# Patient Record
Sex: FEMALE
[De-identification: names, ages, dates, MRNs, and addresses within clinical notes are randomized per-mention and may not be internally consistent; named-entity substitution may affect disease eponyms.]

---

## 2018-10-31 ENCOUNTER — HOSPITAL ENCOUNTER (INPATIENT)
Dept: HOSPITAL 89 - OR | Age: 53
LOS: 1 days | Discharge: HOME | DRG: 460 | End: 2018-11-01
Attending: ORTHOPAEDIC SURGERY | Admitting: ORTHOPAEDIC SURGERY
Payer: COMMERCIAL

## 2018-10-31 VITALS — SYSTOLIC BLOOD PRESSURE: 111 MMHG | DIASTOLIC BLOOD PRESSURE: 70 MMHG

## 2018-10-31 VITALS — SYSTOLIC BLOOD PRESSURE: 115 MMHG | DIASTOLIC BLOOD PRESSURE: 82 MMHG

## 2018-10-31 VITALS — DIASTOLIC BLOOD PRESSURE: 83 MMHG | SYSTOLIC BLOOD PRESSURE: 117 MMHG

## 2018-10-31 VITALS — SYSTOLIC BLOOD PRESSURE: 109 MMHG | DIASTOLIC BLOOD PRESSURE: 91 MMHG

## 2018-10-31 VITALS — SYSTOLIC BLOOD PRESSURE: 121 MMHG | DIASTOLIC BLOOD PRESSURE: 82 MMHG

## 2018-10-31 VITALS
BODY MASS INDEX: 24.86 KG/M2 | WEIGHT: 164 LBS | BODY MASS INDEX: 24.86 KG/M2 | HEIGHT: 68 IN | HEIGHT: 68 IN | WEIGHT: 164 LBS

## 2018-10-31 VITALS — DIASTOLIC BLOOD PRESSURE: 80 MMHG | SYSTOLIC BLOOD PRESSURE: 118 MMHG

## 2018-10-31 VITALS — DIASTOLIC BLOOD PRESSURE: 77 MMHG | SYSTOLIC BLOOD PRESSURE: 121 MMHG

## 2018-10-31 VITALS — SYSTOLIC BLOOD PRESSURE: 114 MMHG | DIASTOLIC BLOOD PRESSURE: 81 MMHG

## 2018-10-31 VITALS — DIASTOLIC BLOOD PRESSURE: 80 MMHG | SYSTOLIC BLOOD PRESSURE: 119 MMHG

## 2018-10-31 VITALS — DIASTOLIC BLOOD PRESSURE: 70 MMHG | SYSTOLIC BLOOD PRESSURE: 111 MMHG

## 2018-10-31 VITALS — DIASTOLIC BLOOD PRESSURE: 57 MMHG | SYSTOLIC BLOOD PRESSURE: 105 MMHG

## 2018-10-31 VITALS — DIASTOLIC BLOOD PRESSURE: 84 MMHG | SYSTOLIC BLOOD PRESSURE: 123 MMHG

## 2018-10-31 VITALS — SYSTOLIC BLOOD PRESSURE: 121 MMHG | DIASTOLIC BLOOD PRESSURE: 74 MMHG

## 2018-10-31 DIAGNOSIS — Z90.710: ICD-10-CM

## 2018-10-31 DIAGNOSIS — M32.9: ICD-10-CM

## 2018-10-31 DIAGNOSIS — Z87.891: ICD-10-CM

## 2018-10-31 DIAGNOSIS — K21.9: ICD-10-CM

## 2018-10-31 DIAGNOSIS — M48.062: Primary | ICD-10-CM

## 2018-10-31 DIAGNOSIS — Z99.81: ICD-10-CM

## 2018-10-31 DIAGNOSIS — J44.9: ICD-10-CM

## 2018-10-31 DIAGNOSIS — M51.16: ICD-10-CM

## 2018-10-31 DIAGNOSIS — Z79.52: ICD-10-CM

## 2018-10-31 DIAGNOSIS — M43.16: ICD-10-CM

## 2018-10-31 LAB — PLATELET COUNT, AUTOMATED: 246 K/UL (ref 150–450)

## 2018-10-31 PROCEDURE — 86850 RBC ANTIBODY SCREEN: CPT

## 2018-10-31 PROCEDURE — 86901 BLOOD TYPING SEROLOGIC RH(D): CPT

## 2018-10-31 PROCEDURE — 36415 COLL VENOUS BLD VENIPUNCTURE: CPT

## 2018-10-31 PROCEDURE — 86900 BLOOD TYPING SEROLOGIC ABO: CPT

## 2018-10-31 PROCEDURE — 01NB0ZZ RELEASE LUMBAR NERVE, OPEN APPROACH: ICD-10-PCS | Performed by: ORTHOPAEDIC SURGERY

## 2018-10-31 PROCEDURE — 0SB20ZZ EXCISION OF LUMBAR VERTEBRAL DISC, OPEN APPROACH: ICD-10-PCS | Performed by: ORTHOPAEDIC SURGERY

## 2018-10-31 PROCEDURE — 85025 COMPLETE CBC W/AUTO DIFF WBC: CPT

## 2018-10-31 PROCEDURE — 0SG0071 FUSION OF LUMBAR VERTEBRAL JOINT WITH AUTOLOGOUS TISSUE SUBSTITUTE, POSTERIOR APPROACH, POSTERIOR COLUMN, OPEN APPROACH: ICD-10-PCS | Performed by: ORTHOPAEDIC SURGERY

## 2018-10-31 PROCEDURE — 72020 X-RAY EXAM OF SPINE 1 VIEW: CPT

## 2018-10-31 PROCEDURE — 72100 X-RAY EXAM L-S SPINE 2/3 VWS: CPT

## 2018-10-31 PROCEDURE — 97161 PT EVAL LOW COMPLEX 20 MIN: CPT

## 2018-10-31 RX ADMIN — SODIUM CHLORIDE SCH MG: 900 INJECTION, SOLUTION INTRAVENOUS at 21:18

## 2018-10-31 RX ADMIN — HYDROCODONE BITARTRATE AND ACETAMINOPHEN PRN EACH: 5; 325 TABLET ORAL at 21:07

## 2018-10-31 RX ADMIN — DOCUSATE SODIUM SCH MG: 100 CAPSULE, LIQUID FILLED ORAL at 21:08

## 2018-10-31 RX ADMIN — HYDROCODONE BITARTRATE AND ACETAMINOPHEN PRN EACH: 5; 325 TABLET ORAL at 16:57

## 2018-10-31 RX ADMIN — CEFAZOLIN SODIUM SCH MLS/HR: 2 SOLUTION INTRAVENOUS at 21:16

## 2018-10-31 NOTE — RADIOLOGY IMAGING REPORT
FACILITY: [facility] 

 

PATIENT NAME: Alisson Montgomery

: 1965

MR: [id number]

V: [id number]

EXAM DATE: 

ORDERING PHYSICIAN: AMRITA GUERRA

TECHNOLOGIST: 

 

Location: [facility]

Patient: Alisson Montgomery

: 1965

MRN: [id number]

Visit/Account:[id number]

Date of Sevice: 10/31/2018

 

ACCESSION #: [id number]

 

EXAMINATION:  Intraoperative imaging of the lumbar spine  10/31/2018 11:54 AM

 

HISTORY:  L4-5 PLIF

 

COMPARISON:  MR 2018 from [facility]

 

FINDINGS:  Intraoperative imaging was done.  Lateral images of the lumbar spine demonstrate initial p
osterior localization with subsequent pedicle screw placement and posterior fusion from L4 through L5
.  Aorta is atherosclerotic.

 

IMPRESSION: Lumbar imaging provided intraoperatively during L4-5 fusion.

 

Report Dictated By: Hipolito Li MD at 10/31/2018 5:58 PM

 

Report E-Signed By: Hipolito Li MD  at 10/31/2018 6:00 PM

 

WSN:[id number]

## 2018-10-31 NOTE — HOSPITALIST CONSULTATION
History of Present Illness


Requesting Physician


Dr Sharpe


Reason for Consult


Lupus


History of Present Illness


This patient was admitted for a laminectomy and fusion.  It is reported that the


surgery went well and was without complication.





History


Problems:  


(1) GERD (gastroesophageal reflux disease)


(2) History of hysterectomy


(3) Lupus


Home Meds


Reported Medications


Tramadol Hcl (TRAMADOL HCL) 50 Mg Tablet, 50 MG PO Q4-6H PRN for PAIN, TAB


   10/29/18


Hydrocodone Bit/Acetaminophen (HYDROCODON-ACETAMINOPHEN 5-325) 1 Each Tablet, 1 


EACH PO Q4H PRN for PAIN, TAB


   10/29/18


Loratadine (LORATADINE) 10 Mg Tablet, 10 MG PO QDAY


   10/29/18


Ondansetron (ZOFRAN ODT) 4 Mg Tab.rapdis, 4 MG PO PRN, TAB.SOL


   10/29/18


Potassium Bicarbonate/Cit Ac (POTASSIUM 25 MEQ TABLET EFF) 25 Meq Tablet.eff, 1 


TAB PO DAILY


   10/29/18


Lorazepam (LORAZEPAM) 0.5 Mg Tablet, 0.5 MG PO Q4-6H PRN for ANXIETY


   10/29/18


Lysine (LYSINE) 500 Mg Tablet, 500 MG PO DAILY


   10/29/18


Ascorbic Acid (VITAMIN C) 1,000 Mg Tablet.er, 1000 MG PO QDAY


   10/29/18


Calcium Carbonate (CALCIUM) 600 Mg Tablet, 800 MG PO QDAY


   10/29/18


Cholecalciferol (Vitamin D3) (VITAMIN D3) 1,000 Unit Tablet, 1000 UNIT PO QDAY, 


TAB


   10/29/18


Loratadine (LORATADINE) 10 Mg Tab.rapdis, 10 MG PO QDAYA


   10/29/18


Pantoprazole Sodium (PANTOPRAZOLE SODIUM) 40 Mg Tablet.dr, 40 MG PO QDAY, TAB.SR


   10/29/18


Trazodone Hcl (TRAZODONE HCL) 100 Mg Tablet, 200 MG PO QHS, TAB


   10/29/18


Citalopram Hydrobromide (CITALOPRAM HBR) 20 Mg Tablet, 40 MG PO QDAY, #5 TAB


   10/29/18


Dapsone (DAPSONE) 100 Mg Tablet, 100 MG PO QDAY


   10/29/18


Prednisone (PREDNISONE) 20 Mg Tablet, 20-40 MG PO QDAY, TAB


   10/29/18


Allergies:  


Coded Allergies:  


     No Known Drug Allergies (Unverified , 10/29/18)


Patient History:  


FHx: lymphoma


  CHILD


Hx Smoking:  Yes (1 PPD FOR 30 YEARS)


Caffeine Intake:  Coffee


Caffeine/Cups Per Day:  4-6 CUPS QDAY


Hx Alcohol Use:  No


Hx Substance Use Disorder:  No





Review of Systems


Cardiovascular:  No Chest Pain


Respiratory:  No Shortness of Breath





Exam


Vital Signs





Vital Signs








  Date Time  Temp Pulse Resp B/P (MAP) Pulse Ox O2 Delivery O2 Flow Rate FiO2


 


10/31/18 16:30 98.7 75 12  93 Nasal Cannula 4.0 








Neuro:  No Gross deficits


Eyes:  PERRLA


Cardiovascular:  Regular Rate and Rhythm


Respiratory:  Clear to Auscultation


Extremities:  No Edema





Medical Decision Making


Data Points


Result Diagram:  


10/29/18 1012








Assessment and Plan


Problems:  


(1) Lupus


Assessment & Plan:  She is on chronic treatment with prednisone.  We have 


implemented the recommendations from her primary physician for stress dosing.





(2) GERD (gastroesophageal reflux disease)


Assessment & Plan:  She is on chronic treatment with Protonix.





Copies to:   AMRITA SHARPE MD ;





Venous Thromboembolism


Antithrombotics


Is Pt On Any Antithrombotics?:  No











IVAN FERRELL DO                  Oct 31, 2018 17:51

## 2018-10-31 NOTE — OPERATIVE REPORT 1
EVENT DATE:  October 31, 2018 

SURGEON:  Jose E Sharpe MD 

ANESTHESIOLOGIST:  Chivo Alfaro MD 

ANESTHESIA:  General endotracheal anesthesia.

ASSISTANT:  BANDAR Gill 





PREOPERATIVE DIAGNOSIS  

L4-L5 spinal stenosis with L4-L5 spondylolisthesis.



POSTOPERATIVE DIAGNOSIS 

L4-L5 spinal stenosis with L4-L5 spondylolisthesis.



PROCEDURES PERFORMED 

1.  L4-L5 laminectomy.

2.  L4-L5 diskectomy. 

3.  L4-L5 posterolateral instrumented fusion.



INTRAVENOUS FLUIDS 

1700 mL 



ESTIMATED BLOOD LOSS 

120 mL 



IMPLANTS USED 

Reline pedicle screws 6.5 mm x 45 mm times four from [employer], 5.5 mm x 40 mm 

connecting rods from [employer] times two, locking caps times four from [employer].



SPECIMENS

None.



DRAINS

None.



COMPLICATIONS

None.  



DISPOSITION 

Post-Anesthesia Care Unit.  



INDICATIONS FOR SURGERY

Ms. Montgomery is a 53-year-old female with a long-standing history of worsening 

right lower extremity radiating pain, numbness, and tingling, primarily in an L5

distribution.  Her symptoms were severe and unrelenting despite physical 

therapy, injections, medications, and activity modifications.  Her physical 

examination was essentially normal with normal strength and sensation, but her 

MRI showed severe spinal stenosis at L4-L5 secondary to a combination of a 

large, broad-based disk bulge, facet hypertrophy, and ligamentum flavum 

thickening.  A grade 1 anterolisthesis was seen at that level as well.  

Secondary to ongoing symptoms and failure of nonsurgical care, Ms. Montgomery was 

offered and elected to undergo L4-L5 laminectomy and posterolateral instrumented

fusion.  



Prior to surgery, I explained in detail to the patient the possible risks of 

surgery.  These risks include bleeding, infection, damage to surrounding 

structures, nerve root injury, spinal fluid leak, meningitis, persistent and/or 

worsening pain, hardware failure, need for further surgery, death, blindness, 

sexual dysfunction, and autonomic nervous system dysfunction, as well as other 

unforeseen medical and surgical complications.  Ms. Montgomery understands that her

risk of complications are increased secondary to her autoimmune disease and need

for chronic steroid use.  



DESCRIPTION OF PROCEDURE 

On the date of surgery, the patient was met in the preoperative hold area, and 

all questions were answered.  The operative site was identified and marked by 

myself.  The patient was brought in good condition to the operating room, and 

after succumbing to anesthesia, was positioned in the prone position on a 

Gareth table.  All bony protuberances and soft tissues were well padded in the 

standard fashion.  Care was taken to maintain appropriate perfusion pressures 

during anesthesia.  Preoperative antibiotics were administered according to the 

appropriate timing schedule.  At the conclusion of the procedure, sponge and 

needle counts were correct times two.  



Final timeout was undertaken by members of the operating team to confirm correct

patient, correct levels, and correct surgery.  The patient was then prepped and 

draped in the standard sterile orthopedic fashion, and a midline vertical 

incision was made over the intended spinal levels.  Sharp dissection was carried

out down to the posterior elements, and soft tissues were elevated off the 

posterior elements in a subperiosteal manner.  A lateral radiograph was obtained

to confirm appropriate spinal level.  Soft tissues were cleared down the 

laminae, up the pars interarticularis, and out to the tips of the transverse 

processes bilaterally at L4 and L5.  Thrombin-soaked sponges were then placed in

the lateral gutters to maintain that space.  



A Leksell rongeur was used to remove the inferior aspect of the L3 lamina, the 

entire L4 lamina, and the superior aspect of the L5 lamina.  Spinous processes 

were removed as well.  The lamina was thinned down the midline, again with a 

Leksell rongeur, and then a Johnson curette was used to enter the canal by 

undermining the superior insertion of the ligamentum flavum from the inferior 

aspect of the L4 lamina.  The Acadia elevator was used prior to the use of the 

Kerrison punch to ensure no dural adhesions were present, and we cleared any of 

these from surrounding bone and soft tissue.  Kerrison 3.0 and 4.0 rongeurs were

then used to perform a midline decompression, and then we performed bilateral 

lateral recess decompressions again with #3 and #4 Kerrisons.  The Acadia 

elevator was used to check the lateral recesses for any persistent compression, 

and none was found.  Limited foraminotomies were performed bilaterally of all 

affected nerve roots to ensure that those were well decompressed as well.  The 

Acadia was then used to sweep anterior to the L5 nerve root shoulder as well as

the dura, and then the nerve root shoulder and dura were retracted towards the 

midline.  A large, calcified, contained disk herniation was found, and an 

annulotomy was performed.  Several large disk fragments were removed.  This 

resulted in no further compression of the neural elements as they traversed the 

disk space.  



Attention was then turned to placement of pedicle screws.  Starting points for 

pedicle screws were identified at approximately the junction of the pars 

interarticularis, the mid point of the transverse process, and the lateral 

aspect of the facet joint.  A high-speed lora was used to decorticate the 

starting point, and then a gear shift type instrument was used to advance 

through the pedicle isthmus using a push-pull method.  A ball tip feeler was 

used to palpate through the pedicle, palpating superiorly, inferiorly, medially,

laterally, and distally to ensure absence of bony breaching.  Screws 6.5 mm x 45

mm were then chosen and placed bilaterally in a similar fashion at L4 and L5.  

Neurophysiologic monitoring was used to test the screws and confirm absence of 

bony breaching, and all screws tested greater than 30 mA.  Connecting rods 40 mm

were then chosen and placed within the tulips of the screws bilaterally.  

Locking caps were placed, tightened, and then finally tightened.  



A lateral radiograph was obtained to confirm appropriate positioning of the 

instrumentation.  A high-speed lora was then used to decorticate the transverse 

processes of L4 and L5 bilaterally.  A combination of milled local bone and 

demineralized bone matrix was then packed into the lateral gutters overlying the

decorticated transverse processes bilaterally.  Of note, prior to decortication 

of the transverse processes and placement of bone graft, we did irrigate the 

wound with 2 L of sterile saline solution.  



Meticulous hemostasis was then obtained, and the wound was closed in layers 

using interrupted sutures for the deep fascia, inverted interrupted sutures for 

the subcutaneous tissue, and a running subcuticular skin stitch.  Sponge and 

needle counts were correct times two.



POSTOPERATIVE CARE PLAN 

Ms. Montgomery will remain in the hospital until she meets discharge criteria.  She

will follow up in my clinic in two weeks' time for a wound check and 

examination.  

BRAXTON

## 2018-11-01 VITALS — DIASTOLIC BLOOD PRESSURE: 81 MMHG | SYSTOLIC BLOOD PRESSURE: 137 MMHG

## 2018-11-01 VITALS — DIASTOLIC BLOOD PRESSURE: 73 MMHG | SYSTOLIC BLOOD PRESSURE: 124 MMHG

## 2018-11-01 VITALS — DIASTOLIC BLOOD PRESSURE: 80 MMHG | SYSTOLIC BLOOD PRESSURE: 133 MMHG

## 2018-11-01 RX ADMIN — HYDROCODONE BITARTRATE AND ACETAMINOPHEN PRN EACH: 5; 325 TABLET ORAL at 03:22

## 2018-11-01 RX ADMIN — DOCUSATE SODIUM SCH MG: 100 CAPSULE, LIQUID FILLED ORAL at 08:48

## 2018-11-01 RX ADMIN — SODIUM CHLORIDE SCH MG: 900 INJECTION, SOLUTION INTRAVENOUS at 03:27

## 2018-11-01 RX ADMIN — SODIUM CHLORIDE SCH MG: 900 INJECTION, SOLUTION INTRAVENOUS at 11:33

## 2018-11-01 RX ADMIN — CEFAZOLIN SODIUM SCH MLS/HR: 2 SOLUTION INTRAVENOUS at 11:33

## 2018-11-01 RX ADMIN — HYDROCODONE BITARTRATE AND ACETAMINOPHEN PRN EACH: 5; 325 TABLET ORAL at 07:31

## 2018-11-01 RX ADMIN — HYDROCODONE BITARTRATE AND ACETAMINOPHEN PRN EACH: 5; 325 TABLET ORAL at 11:33

## 2018-11-01 RX ADMIN — CEFAZOLIN SODIUM SCH MLS/HR: 2 SOLUTION INTRAVENOUS at 03:25

## 2018-11-01 NOTE — RADIOLOGY IMAGING REPORT
FACILITY: [facility] [place] 

 

PATIENT NAME: Alisson Montgomery

: 1965

MR: [id number]

V: [id number]

EXAM DATE: 

ORDERING PHYSICIAN: AMRITA GUERRA

TECHNOLOGIST: 

 

[place] [facility] [place]

Patient: Alisson Montgomery

: 1965

MRN: [id number]

Visit/Account:[id number]

Date of Sevice: 2018

 

ACCESSION #: [id number]

 

LUMBAR SPINE 2 OR 3 VIEW

 

HISTORY:  Surgery followup

 

COMPARISON:  X-ray examination 2018

 

FINDINGS:

Two views demonstrate anatomic alignment status post laminectomy and posterior fusion L4-L5.

Hardware is intact.

Atherosclerotic aortic calcifications are noted.

 

IMPRESSION:

Anatomic alignment status post posterior fusion L4-L5.

 

Report Dictated By: Ruddy Tim MD at 2018 11:19 AM

 

Report E-Signed By: Ruddy Tim MD  at 2018 11:20 AM

 

WSN:[id number]

## 2018-11-01 NOTE — HOSPITALIST PROGRESS NOTE
Subjective


Progress Notes


Subjective


She has no complaints this morning. She had no acute events overnight.





Patient Complains of:


Cardiovascular:  No: Chest Pain


Respiratory:  No: Shortness of Breath





Physical Exam





Vital Signs








  Date Time  Temp Pulse Resp B/P (MAP) Pulse Ox O2 Delivery O2 Flow Rate FiO2


 


11/1/18 09:38     93 Nasal Cannula 1.5 


 


11/1/18 07:19 98.1 63 12 137/81 (99)    














Intake and Output 


 


 11/1/18





 07:00


 


Intake Total 2625 ml


 


Output Total 80 ml


 


Balance 2545 ml


 


 


 


Intake Oral 850 ml


 


IV Total 1775 ml


 


Output Estimated Blood Loss 80 ml


 


# Voids 4








General Appearance:  Alert, Awake, No Acute Distress, Afebrile


Neuro:  No Gross deficits


Cardiovascular:  Regular Rate and Rhythm


Respiratory:  No Respiratory Distress, Clear to Auscultation


GI:  Soft and Non-Tender


Psych:  Alert & Oriented X3, Appropriate Mood & Affect


Result Diagram:  


10/29/18 1012








Assessment and Plan


Problems:  


(1) Lupus


Assessment & Plan:   She is on chronic treatment with prednisone.  We have 


implemented the recommendations from her primary physician for stress dosing. 


She will continue her usual home dosing tomorrow morning. 





(2) GERD (gastroesophageal reflux disease)


Assessment & Plan:  She is on chronic treatment with Protonix.








Exam


Sepsis Risk:  No Definite Risk











CHRISTIAN MOSS             Nov 1, 2018 11:07